# Patient Record
Sex: FEMALE | URBAN - METROPOLITAN AREA
[De-identification: names, ages, dates, MRNs, and addresses within clinical notes are randomized per-mention and may not be internally consistent; named-entity substitution may affect disease eponyms.]

---

## 2024-08-21 ENCOUNTER — APPOINTMENT (RX ONLY)
Dept: URBAN - METROPOLITAN AREA CLINIC 28 | Facility: CLINIC | Age: 74
Setting detail: DERMATOLOGY
End: 2024-08-21

## 2024-08-21 DIAGNOSIS — Z71.89 OTHER SPECIFIED COUNSELING: ICD-10-CM

## 2024-08-21 DIAGNOSIS — L91.0 HYPERTROPHIC SCAR: ICD-10-CM | Status: STABLE

## 2024-08-21 DIAGNOSIS — Q82.5 CONGENITAL NON-NEOPLASTIC NEVUS: ICD-10-CM | Status: STABLE

## 2024-08-21 PROBLEM — D23.61 OTHER BENIGN NEOPLASM OF SKIN OF RIGHT UPPER LIMB, INCLUDING SHOULDER: Status: ACTIVE | Noted: 2024-08-21

## 2024-08-21 PROCEDURE — 99203 OFFICE O/P NEW LOW 30 MIN: CPT

## 2024-08-21 PROCEDURE — ? OBSERVATION AND MEASURE

## 2024-08-21 PROCEDURE — ? COUNSELING

## 2024-08-21 ASSESSMENT — LOCATION DETAILED DESCRIPTION DERM
LOCATION DETAILED: RIGHT LATERAL BREAST 9-10:00 REGION
LOCATION DETAILED: LEFT LATERAL BREAST 3-4:00 REGION

## 2024-08-21 ASSESSMENT — LOCATION SIMPLE DESCRIPTION DERM
LOCATION SIMPLE: RIGHT BREAST
LOCATION SIMPLE: LEFT BREAST

## 2024-08-21 ASSESSMENT — LOCATION ZONE DERM: LOCATION ZONE: TRUNK

## 2024-08-21 NOTE — PROCEDURE: COUNSELING
Detail Level: Detailed
Patient Specific Counseling (Will Not Stick From Patient To Patient): -\\n\\nIt has been present for many years and has been stable. recommended no treatment/observe, RTC if ever changes. Removal is not recommended (patient asked) and we discussed why/risks. 
Patient Specific Counseling (Will Not Stick From Patient To Patient): -\\nTHESE ARE OLD AND STABLE, THOUGH SOMETIMES TENDER\\nPatient reports that the scars are there from breast reduction more than 10 years ago in [place]. \\nShe has had a history of steroid injections in the past and helped a little. \\n\\nDiscussed treatment options steroid injections vs over the counter silicone gel sheets vs scar revision (patient brought this up) but reviewed that the surgeons can remove the scar and hope the it heals with a better scar, but sometimes it can make it worse. \\nReviewed that sometimes the silicone gel sheets can help improve and smooth the scars if you wear them 24 hours a day for a few weeks \\nReviewed that the steroid injections don’t always work and it would take multiple treatments. She elects to try otc Silicone gel sheets for now.
Patient Specific Counseling (Will Not Stick From Patient To Patient): -\\n\\nGroup of them on the right anterior shoulder. \\n\\nBeen present for many years (since a teen) but they have been stable. \\nNo picking or squeezing that could make them angry. Since no symptoms and stable, recommended no treatment/observe and RTC if changes or with symptoms.\\n\\nExcision is not recommended (patient asked) and we discussed why/risks.
Skin Checks Recommendations: SSE q month\\nFBSE with derm q year
Detail Level: Generalized
Patient Specific Counseling (Will Not Stick From Patient To Patient): --\\n\\nQ day pp discussed and SPF q day\\nConsider q year FBSE-discussed\\nSSE, RTC with changes or unusual spots

## 2024-08-21 NOTE — HPI: OTHER
Condition:: Skin lesion R forearm
Please Describe Your Condition:: is a new patient who is being seen for a chief complaint of Skin lesion R forearm. A slightly raised dark spot since a year, actually she thinks many years. Not tender or itchy. No symptoms and not changing. No personal or family  history of skin cancer or skin issues
Condition:: Concerning area on Right Shoulder
Please Describe Your Condition:: is a new patient who is being seen for a chief complaint of an old area on Right anterior Shoulder. The area is closer to her bra strap line area. It is rough and  has some black heads as described by patient. She has it since she was a teen . It used to swell up and when she would pop it or squeeze stuff out of it- it had a stinky smell. Has been stable but she would like dr to check it out. It hasn't swelled or bothered her in years.
Condition:: Hypertrophic scars
Please Describe Your Condition:: The patient has old matching small thick scars of both lateral chest/thorax from breast reduction surgery many years ago. Luckily even though the scars extend below entire inframammary area, only the very lateral portion got thick. They are stable/not growing or changing. She had a treatment with steroid shots years ago and it helped for a while. She wonders what she could do now. Sometimes they are uncomfortable/clothes rub them. She is considering seeing a plastic surgeon here in [facility] for scar revision surgery. Her initial surgery was in [place].